# Patient Record
Sex: MALE | Race: WHITE | NOT HISPANIC OR LATINO | Employment: FULL TIME | URBAN - METROPOLITAN AREA
[De-identification: names, ages, dates, MRNs, and addresses within clinical notes are randomized per-mention and may not be internally consistent; named-entity substitution may affect disease eponyms.]

---

## 2024-07-30 ASSESSMENT — ACTIVITIES OF DAILY LIVING (ADL)
GETTING_INTO_AND_OUT_OF_A_BATHTUB: EXTREME DIFFICULTY
SPORTS_SCORE(%): INCOMPLETE
HOW_WOULD_YOU_RATE_YOUR_CURRENT_LEVEL_OF_FUNCTION_DURING_YOUR_USUAL_ACTIVITIES_OF_DAILY_LIVING_FROM_0_TO_100_WITH_100_BEING_YOUR_LEVEL_OF_FUNCTION_PRIOR_TO_YOUR_HIP_PROBLEM_AND_0_BEING_THE_INABILITY_TO_PERFORM_ANY_OF_YOUR_USUAL_DAILY_ACTIVITIES?: 20
WALKING_DOWN_STEEP_HILLS: EXTREME DIFFICULTY
WALKING_INITIALLY: NO DIFFICULTY AT ALL
WALKING_INITIALLY: NO DIFFICULTY AT ALL
SITTING_FOR_15_MINUTES: NO DIFFICULTY AT ALL
STANDING_FOR_15_MINUTES: EXTREME DIFFICULTY
DEEP_SQUATTING: EXTREME DIFFICULTY
ROLLING OVER IN BED: EXTREME DIFFICULTY
GOING DOWN 1 FLIGHT OF STAIRS: EXTREME DIFFICULTY
HOS_ADL_ITEM_SCORE_TOTAL: 21
DEEP SQUATTING: EXTREME DIFFICULTY
GOING UP 1 FLIGHT OF STAIRS: EXTREME DIFFICULTY
HEAVY_WORK: EXTREME DIFFICULTY
SPORTS_HIGHEST_POTENTIAL_SCORE: INCOMPLETE
WALKING_15_MINUTES_OR_GREATER: EXTREME DIFFICULTY
ADL_HIGHEST_POTENTIAL_SCORE: 68
ADL_COUNT: 17
GOING_UP_1_FLIGHT_OF_STAIRS: EXTREME DIFFICULTY
WALKING_APPROXIMATELY_10_MINUTES: EXTREME DIFFICULTY
HOW_WOULD_YOU_RATE_YOUR_CURRENT_LEVEL_OF_FUNCTION_DURING_YOUR_USUAL_ACTIVITIES_OF_DAILY_LIVING_FROM_0_TO_100_WITH_100_BEING_YOUR_LEVEL_OF_FUNCTION_PRIOR_TO_YOUR_HIP_PROBLEM_AND_0_BEING_THE_INABILITY_TO_PERFORM_ANY_OF_YOUR_USUAL_DAILY_ACTIVITIES?: 20
HEAVY_WORK: EXTREME DIFFICULTY
TWISTING/PIVOTING_ON_INVOLVED_LEG: EXTREME DIFFICULTY
GETTING INTO AND OUT OF AN AVERAGE CAR: EXTREME DIFFICULTY
ADL_TOTAL_ITEM_SCORE: 0
HOW_WOULD_YOU_RATE_YOUR_CURRENT_LEVEL_OF_FUNCTION?: SEVERELY ABNORMAL
STEPPING_UP_AND_DOWN_CURBS: EXTREME DIFFICULTY
GETTING_INTO_AND_OUT_OF_AN_AVERAGE_CAR: EXTREME DIFFICULTY
SPORTS_COUNT: INCOMPLETE
PLEASE_INDICATE_YOR_PRIMARY_REASON_FOR_REFERRAL_TO_THERAPY:: HIP
WALKING_FOR_APPROXIMATELY_10_MINUTES: EXTREME DIFFICULTY
RECREATIONAL_ACTIVITIES: EXTREME DIFFICULTY
PUTTING_ON_SOCKS_AND_SHOES: EXTREME DIFFICULTY
SPORTS_TOTAL_ITEM_SCORE: INCOMPLETE
STEPPING UP AND DOWN CURBS: EXTREME DIFFICULTY
PUTTING ON SOCKS AND SHOES: EXTREME DIFFICULTY
WALKING_15_MINUTES_OR_GREATER: EXTREME DIFFICULTY
ADL_SCORE(%): 0
STANDING FOR 15 MINUTES: EXTREME DIFFICULTY
WALKING_UP_STEEP_HILLS: EXTREME DIFFICULTY
GETTING_INTO_AND_OUT_OF_A_BATHTUB: EXTREME DIFFICULTY
ROLLING_OVER_IN_BED: EXTREME DIFFICULTY
WALKING_UP_STEEP_HILLS: EXTREME DIFFICULTY
LIGHT_TO_MODERATE_WORK: MODERATE DIFFICULTY
RECREATIONAL ACTIVITIES: EXTREME DIFFICULTY
LIGHT_TO_MODERATE_WORK: MODERATE DIFFICULTY
SITTING FOR 15 MINUTES: NO DIFFICULTY AT ALL
TWISTING/PIVOTING ON INVOLVED LEG: EXTREME DIFFICULTY
HOS_ADL_SCORE(%): 30.88
GOING_DOWN_1_FLIGHT_OF_STAIRS: EXTREME DIFFICULTY
WALKING_DOWN_STEEP_HILLS: EXTREME DIFFICULTY
HOS_ADL_HIGHEST_POTENTIAL_SCORE: 68

## 2024-07-31 ENCOUNTER — THERAPY VISIT (OUTPATIENT)
Dept: PHYSICAL THERAPY | Facility: CLINIC | Age: 59
End: 2024-07-31
Payer: COMMERCIAL

## 2024-07-31 ENCOUNTER — TRANSCRIBE ORDERS (OUTPATIENT)
Dept: OTHER | Age: 59
End: 2024-07-31

## 2024-07-31 DIAGNOSIS — M54.16 LUMBAR RADICULOPATHY: Primary | ICD-10-CM

## 2024-07-31 DIAGNOSIS — M25.552 LATERAL PAIN OF LEFT HIP: Primary | ICD-10-CM

## 2024-07-31 PROCEDURE — 97161 PT EVAL LOW COMPLEX 20 MIN: CPT | Mod: GP | Performed by: PHYSICAL THERAPIST

## 2024-07-31 PROCEDURE — 97110 THERAPEUTIC EXERCISES: CPT | Mod: GP | Performed by: PHYSICAL THERAPIST

## 2024-07-31 ASSESSMENT — ACTIVITIES OF DAILY LIVING (ADL)
WALKING_15_MINUTES_OR_GREATER: EXTREME DIFFICULTY
ADL_HIGHEST_POTENTIAL_SCORE: 68
WALKING_DOWN_STEEP_HILLS: EXTREME DIFFICULTY
LOW_IMPACT_ACTIVITIES_LIKE_FAST_WALKING: EXTREME DIFFICULTY
WALKING_APPROXIMATELY_10_MINUTES: EXTREME DIFFICULTY
SPORTS_HIGHEST_POTENTIAL_SCORE: 36
WALKING_UP_STEEP_HILLS: EXTREME DIFFICULTY
SWINGING_OBJECTS_LIKE_A_GOLF_CLUB: EXTREME DIFFICULTY
HEAVY_WORK: EXTREME DIFFICULTY
WALKING_INITIALLY: EXTREME DIFFICULTY
TWISTING/PIVOTING ON INVOLVED LEG: EXTREME DIFFICULTY
SPORTS_SCORE(%): 0
SPORTS_TOTAL_ITEM_SCORE: 0
STANDING FOR 15 MINUTES: EXTREME DIFFICULTY
SPORTS_COUNT: 9
STARTING_AND_STOPPING_QUICKLY: EXTREME DIFFICULTY
GETTING_INTO_AND_OUT_OF_A_BATHTUB: EXTREME DIFFICULTY
LIGHT_TO_MODERATE_WORK: EXTREME DIFFICULTY
GETTING INTO AND OUT OF AN AVERAGE CAR: EXTREME DIFFICULTY
LIGHT_TO_MODERATE_WORK: EXTREME DIFFICULTY
LANDING: EXTREME DIFFICULTY
WALKING_INITIALLY: EXTREME DIFFICULTY
ROLLING_OVER_IN_BED: EXTREME DIFFICULTY
PUTTING_ON_SOCKS_AND_SHOES: EXTREME DIFFICULTY
ADL_TOTAL_ITEM_SCORE: 0
ROLLING OVER IN BED: EXTREME DIFFICULTY
STEPPING_UP_AND_DOWN_CURBS: EXTREME DIFFICULTY
RECREATIONAL ACTIVITIES: EXTREME DIFFICULTY
GETTING_INTO_AND_OUT_OF_AN_AVERAGE_CAR: EXTREME DIFFICULTY
WALKING_15_MINUTES_OR_GREATER: EXTREME DIFFICULTY
JUMPING: EXTREME DIFFICULTY
STEPPING UP AND DOWN CURBS: EXTREME DIFFICULTY
STANDING_FOR_15_MINUTES: EXTREME DIFFICULTY
GOING_UP_1_FLIGHT_OF_STAIRS: EXTREME DIFFICULTY
WALKING_DOWN_STEEP_HILLS: EXTREME DIFFICULTY
HOS_ADL_ITEM_SCORE_TOTAL: 17
PLEASE_INDICATE_YOR_PRIMARY_REASON_FOR_REFERRAL_TO_THERAPY:: HIP
SITTING_FOR_15_MINUTES: EXTREME DIFFICULTY
RECREATIONAL_ACTIVITIES: EXTREME DIFFICULTY
ABILITY_TO_PERFORM_ACTIVITY_WITH_YOUR_NORMAL_TECHNIQUE: EXTREME DIFFICULTY
PUTTING ON SOCKS AND SHOES: EXTREME DIFFICULTY
ADL_SCORE(%): 0
CUTTING/LATERAL_MOVEMENTS: EXTREME DIFFICULTY
DEEP SQUATTING: EXTREME DIFFICULTY
DEEP_SQUATTING: EXTREME DIFFICULTY
GOING_DOWN_1_FLIGHT_OF_STAIRS: EXTREME DIFFICULTY
SITTING FOR 15 MINUTES: EXTREME DIFFICULTY
GOING DOWN 1 FLIGHT OF STAIRS: EXTREME DIFFICULTY
GOING UP 1 FLIGHT OF STAIRS: EXTREME DIFFICULTY
TWISTING/PIVOTING_ON_INVOLVED_LEG: EXTREME DIFFICULTY
ABILITY_TO_PARTICIPATE_IN_YOUR_DESIRED_SPORT_AS_LONG_AS_YOU_WOULD_LIKE: EXTREME DIFFICULTY
HOS_ADL_SCORE(%): 25
HEAVY_WORK: EXTREME DIFFICULTY
ADL_COUNT: 17
WALKING_UP_STEEP_HILLS: EXTREME DIFFICULTY
HOW_WOULD_YOU_RATE_YOUR_CURRENT_LEVEL_OF_FUNCTION?: SEVERELY ABNORMAL
HOS_ADL_HIGHEST_POTENTIAL_SCORE: 68
GETTING_INTO_AND_OUT_OF_A_BATHTUB: EXTREME DIFFICULTY
RUNNING_ONE_MILE: EXTREME DIFFICULTY
WALKING_FOR_APPROXIMATELY_10_MINUTES: EXTREME DIFFICULTY

## 2024-07-31 NOTE — PROGRESS NOTES
PHYSICAL THERAPY EVALUATION  Type of Visit: Evaluation              Subjective     Pt describes nearly constant pain in his left buttock and posterior thigh.  The pain is variable in severity and primarily located in the area of his ischial tuberosity.  Worse with walking, standing.  The entire left hip feels tight.  At times it feels like a knife is being stabbed into his leg.  First felt pain in his left upper buttock about a month ago as he was working out (doing dead lifts).  Within a few weeks the pain moved into his lower buttock and thigh.  He saw a MD recently and was diagnosed with a proximal HS strain and referred to PT.        Presenting condition or subjective complaint: Hamstring strain  Date of onset:      Relevant medical history:     Dates & types of surgery:      Prior diagnostic imaging/testing results: X-ray     Prior therapy history for the same diagnosis, illness or injury: No      Prior Level of Function  Transfers: Independent  Ambulation: Independent  ADL: Independent  IADL:     Living Environment  Social support: With a significant other or spouse   Type of home: House   Stairs to enter the home: Yes 2 Is there a railing: Yes     Ramp:     Stairs inside the home: Yes 15 Is there a railing: Yes     Help at home: None  Equipment owned:       Employment: Yes    Hobbies/Interests:      Patient goals for therapy: Walk!    Pain assessment: See objective evaluation for additional pain details     Objective   LUMBAR SPINE EVALUATION  PAIN: Pain Level at Rest: 3/10  Pain Level with Use: 9/10  Pain Location: left ischial tuberosity region  Pain Quality: Aching, Nagging, Sharp, and Stabbing  Pain is Exacerbated By: standing, walking  Pain is Relieved By: better when sitting and stretching  INTEGUMENTARY (edema, incisions): WNL  POSTURE: WNL  GAIT:   Weightbearing Status: WBAT  Assistive Device(s): None  Gait Deviations: Antalgic  BALANCE/PROPRIOCEPTION:   WEIGHTBEARING ALIGNMENT: Lumbar/Pelvic WB  Alignment:Iliac crest high R  NON-WEIGHTBEARING ALIGNMENT: WNL   ROM:   (Degrees) Left AROM Left PROM  Right AROM Right PROM   Hip Flexion  Full     Hip Extension       Hip Abduction       Hip Adduction       Hip Internal Rotation  Limited and painful     Hip External Rotation  Full     Knee Flexion       Knee Extension       Lumbar Side glide Full Full with relief of pain   Lumbar Flexion Signif limitation with pain.  RFIS worse, no better.  RFIL better.   Lumbar Extension Signif limitation with pain.  PERCY worse, worse.  REIL worse, no better.   Pain:   End feel:   PELVIC/SI SCREEN: WNL  STRENGTH:  slight pain with resisted HS.  Strength WNL's    MYOTOMES: WNL  DTR S:   CORD SIGNS:   DERMATOMES:   NEURAL TENSION:  + Slump and SLR on left  FLEXIBILITY:  tight HS, worse on left  LUMBAR/HIP Special Tests:    PELVIS/SI SPECIAL TESTS:   FUNCTIONAL TESTS:   PALPATION:  pain to palp at the left ischial tuberosity  SPINAL SEGMENTAL CONCLUSIONS:       Assessment & Plan   CLINICAL IMPRESSIONS  Medical Diagnosis: Left prox HS strain    Treatment Diagnosis: Suspected lumbar derangement   Impression/Assessment: Patient is a 58 year old male with left hip and thigh complaints.  The following significant findings have been identified: Pain and Decreased ROM/flexibility. These impairments interfere with their ability to perform self care tasks, work tasks, recreational activities, household chores, driving , household mobility, and community mobility as compared to previous level of function.     Clinical Decision Making (Complexity):  Clinical Presentation: Stable/Uncomplicated  Clinical Presentation Rationale: based on medical and personal factors listed in PT evaluation  Clinical Decision Making (Complexity): Low complexity    PLAN OF CARE  Treatment Interventions:  Modalities: Cryotherapy, Ultrasound  Interventions: Manual Therapy, Therapeutic Exercise    Long Term Goals     PT Goal 1  Goal Identifier: Walking  Goal  Description: Able to walk 15 minutes without antalgic gait or pain  Rationale: to maximize safety and independence with performance of ADLs and functional tasks  Target Date: 08/28/24      Frequency of Treatment: 1x/week  Duration of Treatment: 6 weeks    Recommended Referrals to Other Professionals:   Education Assessment:        Risks and benefits of evaluation/treatment have been explained.   Patient/Family/caregiver agrees with Plan of Care.     Evaluation Time:     PT Eval, Low Complexity Minutes (01481): 25       Signing Clinician: Lalit Garvey PT

## 2024-08-01 ENCOUNTER — THERAPY VISIT (OUTPATIENT)
Dept: PHYSICAL THERAPY | Facility: CLINIC | Age: 59
End: 2024-08-01
Payer: COMMERCIAL

## 2024-08-01 DIAGNOSIS — M54.16 LUMBAR RADICULOPATHY: Primary | ICD-10-CM

## 2024-08-01 DIAGNOSIS — M25.552 LATERAL PAIN OF LEFT HIP: ICD-10-CM

## 2024-08-01 PROCEDURE — 97110 THERAPEUTIC EXERCISES: CPT | Mod: GP | Performed by: PHYSICAL THERAPIST

## 2024-08-12 ENCOUNTER — THERAPY VISIT (OUTPATIENT)
Dept: PHYSICAL THERAPY | Facility: CLINIC | Age: 59
End: 2024-08-12
Payer: COMMERCIAL

## 2024-08-12 DIAGNOSIS — M54.16 LUMBAR RADICULOPATHY: Primary | ICD-10-CM

## 2024-08-12 PROCEDURE — 97110 THERAPEUTIC EXERCISES: CPT | Mod: GP | Performed by: PHYSICAL THERAPIST

## 2024-10-15 PROBLEM — M54.16 LUMBAR RADICULOPATHY: Status: RESOLVED | Noted: 2024-07-31 | Resolved: 2024-10-15

## 2025-08-23 ENCOUNTER — HEALTH MAINTENANCE LETTER (OUTPATIENT)
Age: 60
End: 2025-08-23